# Patient Record
Sex: MALE | Race: OTHER | URBAN - METROPOLITAN AREA
[De-identification: names, ages, dates, MRNs, and addresses within clinical notes are randomized per-mention and may not be internally consistent; named-entity substitution may affect disease eponyms.]

---

## 2023-02-26 ENCOUNTER — EMERGENCY (EMERGENCY)
Facility: HOSPITAL | Age: 23
LOS: 1 days | Discharge: HOME | End: 2023-02-26
Attending: EMERGENCY MEDICINE | Admitting: EMERGENCY MEDICINE
Payer: COMMERCIAL

## 2023-02-26 VITALS
SYSTOLIC BLOOD PRESSURE: 104 MMHG | RESPIRATION RATE: 18 BRPM | OXYGEN SATURATION: 99 % | HEART RATE: 119 BPM | HEIGHT: 68 IN | DIASTOLIC BLOOD PRESSURE: 65 MMHG | WEIGHT: 147.93 LBS | TEMPERATURE: 98 F

## 2023-02-26 VITALS
OXYGEN SATURATION: 98 % | DIASTOLIC BLOOD PRESSURE: 64 MMHG | TEMPERATURE: 98 F | SYSTOLIC BLOOD PRESSURE: 138 MMHG | HEART RATE: 88 BPM | RESPIRATION RATE: 18 BRPM

## 2023-02-26 DIAGNOSIS — E87.1 HYPO-OSMOLALITY AND HYPONATREMIA: ICD-10-CM

## 2023-02-26 DIAGNOSIS — M41.34 THORACOGENIC SCOLIOSIS, THORACIC REGION: ICD-10-CM

## 2023-02-26 DIAGNOSIS — F41.9 ANXIETY DISORDER, UNSPECIFIED: ICD-10-CM

## 2023-02-26 DIAGNOSIS — F19.90 OTHER PSYCHOACTIVE SUBSTANCE USE, UNSPECIFIED, UNCOMPLICATED: ICD-10-CM

## 2023-02-26 DIAGNOSIS — Z20.822 CONTACT WITH AND (SUSPECTED) EXPOSURE TO COVID-19: ICD-10-CM

## 2023-02-26 DIAGNOSIS — R00.2 PALPITATIONS: ICD-10-CM

## 2023-02-26 DIAGNOSIS — R00.0 TACHYCARDIA, UNSPECIFIED: ICD-10-CM

## 2023-02-26 DIAGNOSIS — F17.200 NICOTINE DEPENDENCE, UNSPECIFIED, UNCOMPLICATED: ICD-10-CM

## 2023-02-26 LAB
ALBUMIN SERPL ELPH-MCNC: 4.1 G/DL — SIGNIFICANT CHANGE UP (ref 3.3–5)
ALP SERPL-CCNC: 70 U/L — SIGNIFICANT CHANGE UP (ref 40–120)
ALT FLD-CCNC: 27 U/L — SIGNIFICANT CHANGE UP (ref 10–45)
ANION GAP SERPL CALC-SCNC: 10 MMOL/L — SIGNIFICANT CHANGE UP (ref 5–17)
AST SERPL-CCNC: 36 U/L — SIGNIFICANT CHANGE UP (ref 10–40)
BASOPHILS # BLD AUTO: 0.03 K/UL — SIGNIFICANT CHANGE UP (ref 0–0.2)
BASOPHILS NFR BLD AUTO: 0.4 % — SIGNIFICANT CHANGE UP (ref 0–2)
BILIRUB SERPL-MCNC: 0.4 MG/DL — SIGNIFICANT CHANGE UP (ref 0.2–1.2)
BUN SERPL-MCNC: 12 MG/DL — SIGNIFICANT CHANGE UP (ref 7–23)
CALCIUM SERPL-MCNC: 8.8 MG/DL — SIGNIFICANT CHANGE UP (ref 8.4–10.5)
CHLORIDE SERPL-SCNC: 99 MMOL/L — SIGNIFICANT CHANGE UP (ref 96–108)
CO2 SERPL-SCNC: 23 MMOL/L — SIGNIFICANT CHANGE UP (ref 22–31)
CREAT SERPL-MCNC: 0.69 MG/DL — SIGNIFICANT CHANGE UP (ref 0.5–1.3)
EGFR: 133 ML/MIN/1.73M2 — SIGNIFICANT CHANGE UP
EOSINOPHIL # BLD AUTO: 0.04 K/UL — SIGNIFICANT CHANGE UP (ref 0–0.5)
EOSINOPHIL NFR BLD AUTO: 0.5 % — SIGNIFICANT CHANGE UP (ref 0–6)
GLUCOSE SERPL-MCNC: 103 MG/DL — HIGH (ref 70–99)
HCT VFR BLD CALC: 40.6 % — SIGNIFICANT CHANGE UP (ref 39–50)
HGB BLD-MCNC: 14 G/DL — SIGNIFICANT CHANGE UP (ref 13–17)
IMM GRANULOCYTES NFR BLD AUTO: 0.1 % — SIGNIFICANT CHANGE UP (ref 0–0.9)
LYMPHOCYTES # BLD AUTO: 1.56 K/UL — SIGNIFICANT CHANGE UP (ref 1–3.3)
LYMPHOCYTES # BLD AUTO: 19.3 % — SIGNIFICANT CHANGE UP (ref 13–44)
MAGNESIUM SERPL-MCNC: 1.6 MG/DL — SIGNIFICANT CHANGE UP (ref 1.6–2.6)
MCHC RBC-ENTMCNC: 29.5 PG — SIGNIFICANT CHANGE UP (ref 27–34)
MCHC RBC-ENTMCNC: 34.5 GM/DL — SIGNIFICANT CHANGE UP (ref 32–36)
MCV RBC AUTO: 85.7 FL — SIGNIFICANT CHANGE UP (ref 80–100)
MONOCYTES # BLD AUTO: 0.59 K/UL — SIGNIFICANT CHANGE UP (ref 0–0.9)
MONOCYTES NFR BLD AUTO: 7.3 % — SIGNIFICANT CHANGE UP (ref 2–14)
NEUTROPHILS # BLD AUTO: 5.86 K/UL — SIGNIFICANT CHANGE UP (ref 1.8–7.4)
NEUTROPHILS NFR BLD AUTO: 72.4 % — SIGNIFICANT CHANGE UP (ref 43–77)
NRBC # BLD: 0 /100 WBCS — SIGNIFICANT CHANGE UP (ref 0–0)
PLATELET # BLD AUTO: 263 K/UL — SIGNIFICANT CHANGE UP (ref 150–400)
POTASSIUM SERPL-MCNC: 3.9 MMOL/L — SIGNIFICANT CHANGE UP (ref 3.5–5.3)
POTASSIUM SERPL-SCNC: 3.9 MMOL/L — SIGNIFICANT CHANGE UP (ref 3.5–5.3)
PROT SERPL-MCNC: 7.3 G/DL — SIGNIFICANT CHANGE UP (ref 6–8.3)
RBC # BLD: 4.74 M/UL — SIGNIFICANT CHANGE UP (ref 4.2–5.8)
RBC # FLD: 12.4 % — SIGNIFICANT CHANGE UP (ref 10.3–14.5)
SARS-COV-2 RNA SPEC QL NAA+PROBE: NEGATIVE — SIGNIFICANT CHANGE UP
SODIUM SERPL-SCNC: 132 MMOL/L — LOW (ref 135–145)
TROPONIN T SERPL-MCNC: 0.01 NG/ML — SIGNIFICANT CHANGE UP (ref 0–0.01)
WBC # BLD: 8.09 K/UL — SIGNIFICANT CHANGE UP (ref 3.8–10.5)
WBC # FLD AUTO: 8.09 K/UL — SIGNIFICANT CHANGE UP (ref 3.8–10.5)

## 2023-02-26 PROCEDURE — 93005 ELECTROCARDIOGRAM TRACING: CPT

## 2023-02-26 PROCEDURE — 36415 COLL VENOUS BLD VENIPUNCTURE: CPT

## 2023-02-26 PROCEDURE — 80053 COMPREHEN METABOLIC PANEL: CPT

## 2023-02-26 PROCEDURE — 85025 COMPLETE CBC W/AUTO DIFF WBC: CPT

## 2023-02-26 PROCEDURE — 99285 EMERGENCY DEPT VISIT HI MDM: CPT

## 2023-02-26 PROCEDURE — 99285 EMERGENCY DEPT VISIT HI MDM: CPT | Mod: 25

## 2023-02-26 PROCEDURE — 84484 ASSAY OF TROPONIN QUANT: CPT

## 2023-02-26 PROCEDURE — 71045 X-RAY EXAM CHEST 1 VIEW: CPT | Mod: 26

## 2023-02-26 PROCEDURE — 83735 ASSAY OF MAGNESIUM: CPT

## 2023-02-26 PROCEDURE — 71045 X-RAY EXAM CHEST 1 VIEW: CPT

## 2023-02-26 PROCEDURE — 87635 SARS-COV-2 COVID-19 AMP PRB: CPT

## 2023-02-26 PROCEDURE — 96374 THER/PROPH/DIAG INJ IV PUSH: CPT

## 2023-02-26 RX ORDER — SODIUM CHLORIDE 9 MG/ML
1000 INJECTION INTRAMUSCULAR; INTRAVENOUS; SUBCUTANEOUS ONCE
Refills: 0 | Status: COMPLETED | OUTPATIENT
Start: 2023-02-26 | End: 2023-02-26

## 2023-02-26 RX ADMIN — SODIUM CHLORIDE 1000 MILLILITER(S): 9 INJECTION INTRAMUSCULAR; INTRAVENOUS; SUBCUTANEOUS at 08:46

## 2023-02-26 RX ADMIN — Medication 1 MILLIGRAM(S): at 08:46

## 2023-02-26 NOTE — ED ADULT NURSE NOTE - CAS EDN DISCHARGE INTERVENTIONS
Alert-The patient is alert, awake and responds to voice. The patient is oriented to time, place, and person. The triage nurse is able to obtain subjective information.
IV discontinued, cath removed intact

## 2023-02-26 NOTE — ED PROVIDER NOTE - PATIENT PORTAL LINK FT
You can access the FollowMyHealth Patient Portal offered by [facility] by registering at the following website: [email or web address]. By joining [facility]’s FollowMyHealth portal, you will also be able to view your health information using other applications (apps) compatible with our system.

## 2023-02-26 NOTE — ED PROVIDER NOTE - OBJECTIVE STATEMENT
Patient 22-year-old male, no past medical history, who presents with complaints of feeling very anxious and his heart beating fast starting around 5 AM today.  Patient endorses left-sided chest pain and numbness of his left upper extremity earlier which resolved after an hour and currently has no chest pain.  No sob, diaphoresis, nausea, vomiting, dizziness, syncope. Patient admits to drinking 6 shots of tequila, smoking weed, vaping, and snorting half a gram of cocaine last night.  Patient denies any SI, HI or hallucinations.  Patient denies any history of alcohol withdrawal. Does use above substances 1-2x/wk.

## 2023-02-26 NOTE — ED ADULT TRIAGE NOTE - CHIEF COMPLAINT QUOTE
"I feel very anxious, my heartbeat is beating fast"; admits drinking 6 shots of Tequila, smoke weed and snort cocaine tonight; denies SI or HI

## 2023-02-26 NOTE — ED PROVIDER NOTE - CARE PLAN
Principal Discharge DX:	Palpitations  Secondary Diagnosis:	Anxiety  Secondary Diagnosis:	Polysubstance use disorder   1

## 2023-02-26 NOTE — ED PROVIDER NOTE - CLINICAL SUMMARY MEDICAL DECISION MAKING FREE TEXT BOX
Impression: pt p/w anxiety, palpitations, chest pain in setting of polysubstance use (etoh, marijuana, cocaine). No cp at present. Pt is HDS. EKG showing sinus tach, no stemi. CXR neg for i/e/ptx. Labs notable for mild hyponatremia to 132, otherwise unremarkable including trop. Pt hydrated with ivf, given ativan and feeling much improved afterwards. HR improved as well. ED evaluation and management discussed with the patient in detail. Suspect sx's 2/2 polysubstance use, doubt acs. Detox programs and close PMD follow up encouraged.  Strict ED return instructions discussed in detail and patient given the opportunity to ask any questions about their discharge diagnosis and instructions. Patient verbalized understanding.

## 2023-02-26 NOTE — ED ADULT NURSE NOTE - OBJECTIVE STATEMENT
23 y/o M who denies pmhx presents to the ED c/o episode of CP (now resolved) s/p snorting 1/2 gram cocaine, drinking 6 shots of tequila, smoking 1 bowl of marijuana, and vaping nicotine, stopping at 4 am, and CP starting at 5am. "I couldn't sleep, so I figured I'd come here and get it checked out." Denies buying drugs from a new source/is his usual doses. Relays he uses 1-2x/week. Denies SOB, diaphoresis, dizziness, and any other complaints at this time. On exam, A&Ox4, NAD, ambulatory on RA. ST to 110's on EKG. Pulses 2+ and equal b/l. Lungs CTA b/l. Speech clear. Calm, appropriate.

## 2023-02-26 NOTE — ED PROVIDER NOTE - PHYSICAL EXAMINATION
VITAL SIGNS: I have reviewed nursing notes and confirm.  CONSTITUTIONAL: Well appearing, in no acute distress.   SKIN:  warm and dry, no acute rash.   HEAD:  normocephalic, atraumatic.  EYES: EOM intact; conjunctiva and sclera clear.  ENT: No nasal discharge; airway clear.   NECK: Supple.  CARD: S1, S2, Tachycardic rate and reg rhythm.   RESP:  Clear to auscultation b/l, no wheezes, rales or rhonchi.  ABD: Normal bowel sounds; soft; non-distended; non-tender; no guarding/ rebound.  EXT: Normal ROM. No peripheral edema. Pulses intact and equal b/l.  NEURO: Alert, oriented, grossly unremarkable  PSYCH: Cooperative, mood and affect appropriate.

## 2023-02-26 NOTE — ED PROVIDER NOTE - NSFOLLOWUPINSTRUCTIONS_ED_ALL_ED_FT
Drink plenty of fluids.  Consider detox programs for substance use disorder.  Follow up with your primary care physician for re-evaluation.  Return to er for any new or worsening symptoms.       Lee AnnebRobertopanishTagalogVietnamjosephine                                                                                                                                                                                                                                                                                 Substance Use Disorder      Substance use disorder occurs when a person's repeated use of drugs or alcohol interferes with the ability to be productive. This disorder can cause problems with mental and physical health. It can affect your ability to have healthy relationships, and it can keep you from being able to meet your responsibilities at work, home, or school. It can also lead to addiction, which is a condition in which you cannot stop using the substance consistently for a period of time.    Addiction changes the way the brain works. Because of these changes, addiction is a chronic condition. Substance use disorder can be mild, moderate, or severe.    Some commonly misused substances that can lead to this disorder include:  •Alcohol.      •Tobacco.      •Marijuana.      •Stimulants, such as cocaine and methamphetamine.      •Hallucinogens, such as LSD and PCP.      •Opioids, such as some prescription pain medicines and heroin.        What are the causes?    This condition may develop due to many complex social, psychological, or physical reasons, such as:  •Stress.      •Abuse.      •Peer pressure.      •Anxiety or depression.        What increases the risk?    This condition is more likely to develop in people who:  •Use substances to cope with stress.      •Have been abused.      •Have a mental health disorder, such as depression.      •Have a family history of substance use disorder.        What are the signs or symptoms?    Symptoms of this condition include:  •Using the substance for longer periods of time or at a higher dosage than what is normal or intended.      •Having a lasting desire to use the substance.      •Being unable to slow down or stop the use of the substance.      •Spending an abnormal amount of time getting the substance, using the substance, or recovering from using the substance.      •Using the substance in a way that interferes with work, school, social activities, and personal relationships.    •Using the substance even after having negative consequences, such as:  •Health problems.      •Legal or financial troubles.      •Job loss.      •Relationship problems.        •Needing more and more of the substance to get the same effect (developing tolerance).      •Experiencing unpleasant symptoms if you do not use the substance (withdrawal).      •Using the substance to avoid withdrawal symptoms.        How is this diagnosed?    This condition may be diagnosed based on:  •A physical exam.      •Your history of substance use.    •Your symptoms. This includes:  •How substance use affects your life.      •Changes in personality, behaviors, and mood.      •Having at least two symptoms of substance use disorder within a 12-month period.      •Health issues related to substance use, such as liver damage, shortness of breath, fatigue, cough, or heart problems.        •Blood or urine tests to screen for alcohol and drugs.        How is this treated?  Three people participating in a support group.   This condition may be treated by:  •Stopping substance use safely. This may require taking medicines and being closely monitored for several days.      •Taking part in group and individual counseling from mental health providers who help people with substance use disorder.      •Staying at a live-in (residential) treatment center for several days or weeks.      •Attending daily counseling sessions at a treatment center.    •Taking medicine as told by your health care provider:  •To ease symptoms and prevent complications during withdrawal.      •To treat other mental health issues, such as depression or anxiety.      •To block cravings by causing the same effects as the substance.      •To block the effects of the substance or replace good sensations with unpleasant ones.        •Participating in a support group to share your experience with others who are going through the same thing. These groups are an important part of long-term recovery for many people.      Recovery can be a long process. Many people who undergo treatment start using the substance again after stopping (relapse). If you relapse, that does not mean that treatment will not work.      Follow these instructions at home:  A bottle of beer, a glass of wine, and a glass of hard liquor with a "do not drink" sign over them.    •Take over-the-counter and prescription medicines only as told by your health care provider.      • Do not use any drugs or alcohol.      •Avoid temptations or triggers that you associate with your use of the substance.      •Learn and practice techniques for managing stress.      •Have a plan for vulnerable moments. Get phone numbers of people who are willing to help and who are committed to your recovery.      •Attend support groups on a regular basis. These groups include 12-step programs like Alcoholics Anonymous and Narcotics Anonymous.      •Keep all follow-up visits. This is important. This includes continuing to work with therapists and support groups.        Where to find more information    •Substance Abuse and Mental Health Services Administration (SAMHSA): www.[facility]sa.gov      •National [employer] Mental [employer]ness (LEYDI): www.leydi.org        Contact a health care provider if:    •You cannot take your medicines as told.      •Your symptoms get worse.      •You have trouble resisting the urge to use drugs or alcohol.        Get help right away if:    •You relapse.      •You think that you may have taken too much of a drug. The National Poison Control Center hotline is 1-241.569.9850.    •You have signs of an overdose. Symptoms include:  •Chest pain.      •Confusion.      •Sleepiness or difficulty staying awake.      •Slowed breathing.      •Nausea or vomiting.      •A seizure.        •You have serious thoughts about hurting yourself or someone else.      Drug overdose is an emergency. Do not wait to see if the symptoms will go away. Get medical help right away. Call your local emergency services (115 in the [place] Do not drive yourself to the hospital.     If you ever feel like you may hurt yourself or others, or have thoughts about taking your own life, get help right away. Go to your nearest emergency department or:   • Call your local emergency services (752 in the [place]        • Call a suicide crisis helpline, such as the National Suicide Prevention Lifeline at 1-221.284.4336 or 585 in the [place] This is open 24 hours a day in the [place]       • Text the Crisis Text Line at 212330 (in the [place]         Summary    •Substance use disorder occurs when a person's repeated use of drugs or alcohol interferes with the ability to be productive.      •Taking part in group and individual counseling from mental health providers is a common treatment for people with substance use disorder.      •Recovery can be a long process. Many people who undergo treatment start using the substance again after stopping (relapse). A relapse does not mean that treatment will not work.      •Attend support groups such as Alcoholics Anonymous and Narcotics Anonymous. These groups are an important part of long-term recovery for many people.      This information is not intended to replace advice given to you by your health care provider. Make sure you discuss any questions you have with your health care provider.